# Patient Record
Sex: FEMALE | Race: WHITE
[De-identification: names, ages, dates, MRNs, and addresses within clinical notes are randomized per-mention and may not be internally consistent; named-entity substitution may affect disease eponyms.]

---

## 2017-02-03 ENCOUNTER — HOSPITAL ENCOUNTER (OUTPATIENT)
Dept: HOSPITAL 61 - CT | Age: 67
Discharge: HOME | End: 2017-02-03
Attending: INTERNAL MEDICINE
Payer: MEDICARE

## 2017-02-03 DIAGNOSIS — I70.0: ICD-10-CM

## 2017-02-03 DIAGNOSIS — J98.11: ICD-10-CM

## 2017-02-03 DIAGNOSIS — R91.1: Primary | ICD-10-CM

## 2017-02-03 DIAGNOSIS — I70.90: ICD-10-CM

## 2017-02-03 PROCEDURE — 71250 CT THORAX DX C-: CPT

## 2017-02-03 NOTE — RAD
Exam performed: CT chest without contrast.



History: Lung nodule.



Date of service: 02/03/17. Comparison: None available



Technique: Contiguous helical acquisitions are obtained through the chest

without IV contrast. Sagittal and coronal reformatted images are obtained and

reviewed.



Findings:



Structures at the thoracic inlet including both lobes of the thyroid gland

appear grossly normal. Lack of IV contrast limits evaluation of neck and

intrathoracic great vessels, however they appear grossly normal in course and

caliber. Scattered atheromatous calcification of the aorta and coronary

arteries is seen. There are calcified mediastinal and left hilar lymph nodes,

probably related to remote granulomatous infection. The central airway is

patent without endoluminal lesions.



Interrogation of lungs demonstrates a new small approximately 3 mm

nodularities in the posterior right lower lobe. There is a streaky linear

opacity in the left lung base. There is dependent atelectasis. No pleural

effusion. Postoperative changes are seen in the GE junction.



Limited evaluation of the upper abdominal structures is unremarkable.



Interrogation of bone windows demonstrates no bony abnormalities.



Impression:

Several Tiny 2 to 3 mm nodularities in the right lower lobe are noted.

Reportedly patient has had a prior chest CT which is not available for

comparison and review. If and when the prior CT becomes available, comparison

will be done and addendum will be dictated. Correlate with patient's risk

factors and follow-up according to [employer] society recommendation.



[employer]RS Compliance Statement:



One or more of the following individualized dose reduction techniques were

utilized for this examination:

1. Automated exposure control

2. Adjustment of the mA and/or kV according to patient size

3. Use of iterative reconstruction technique

## 2018-02-23 ENCOUNTER — HOSPITAL ENCOUNTER (OUTPATIENT)
Dept: HOSPITAL 61 - CT | Age: 68
Discharge: HOME | End: 2018-02-23
Attending: INTERNAL MEDICINE
Payer: MEDICARE

## 2018-02-23 DIAGNOSIS — I77.810: ICD-10-CM

## 2018-02-23 DIAGNOSIS — R91.1: ICD-10-CM

## 2018-02-23 DIAGNOSIS — I70.0: ICD-10-CM

## 2018-02-23 DIAGNOSIS — J98.11: Primary | ICD-10-CM

## 2018-02-23 DIAGNOSIS — K44.9: ICD-10-CM

## 2018-02-23 DIAGNOSIS — R91.8: ICD-10-CM

## 2018-02-23 PROCEDURE — 71250 CT THORAX DX C-: CPT
